# Patient Record
Sex: FEMALE | Race: WHITE | NOT HISPANIC OR LATINO | URBAN - METROPOLITAN AREA
[De-identification: names, ages, dates, MRNs, and addresses within clinical notes are randomized per-mention and may not be internally consistent; named-entity substitution may affect disease eponyms.]

---

## 2019-05-15 ENCOUNTER — EMERGENCY (EMERGENCY)
Facility: HOSPITAL | Age: 29
LOS: 1 days | Discharge: HOME | End: 2019-05-15
Attending: EMERGENCY MEDICINE | Admitting: EMERGENCY MEDICINE
Payer: COMMERCIAL

## 2019-05-15 VITALS
RESPIRATION RATE: 16 BRPM | WEIGHT: 110.01 LBS | OXYGEN SATURATION: 98 % | TEMPERATURE: 98 F | HEART RATE: 70 BPM | SYSTOLIC BLOOD PRESSURE: 96 MMHG | HEIGHT: 62 IN | DIASTOLIC BLOOD PRESSURE: 71 MMHG

## 2019-05-15 VITALS
SYSTOLIC BLOOD PRESSURE: 102 MMHG | DIASTOLIC BLOOD PRESSURE: 68 MMHG | HEART RATE: 69 BPM | TEMPERATURE: 98 F | OXYGEN SATURATION: 99 % | RESPIRATION RATE: 18 BRPM

## 2019-05-15 DIAGNOSIS — M79.89 OTHER SPECIFIED SOFT TISSUE DISORDERS: ICD-10-CM

## 2019-05-15 DIAGNOSIS — R60.0 LOCALIZED EDEMA: ICD-10-CM

## 2019-05-15 DIAGNOSIS — Z79.899 OTHER LONG TERM (CURRENT) DRUG THERAPY: ICD-10-CM

## 2019-05-15 PROCEDURE — 99284 EMERGENCY DEPT VISIT MOD MDM: CPT

## 2019-05-15 PROCEDURE — 93971 EXTREMITY STUDY: CPT | Mod: 26,LT

## 2019-05-15 PROCEDURE — 99284 EMERGENCY DEPT VISIT MOD MDM: CPT | Mod: 25

## 2019-05-15 PROCEDURE — 93971 EXTREMITY STUDY: CPT

## 2019-05-15 NOTE — ED PROVIDER NOTE - OBJECTIVE STATEMENT
here with swelling of left leg for past day.  Denies pain, trauma, fever/chills.  No chest pain, trouble breathing.  On ocp daily, no other meds.  No prior pe/dvt.  Saw her pmd and sent to r/o dvt.

## 2019-05-15 NOTE — ED ADULT NURSE NOTE - NSIMPLEMENTINTERV_GEN_ALL_ED
Implemented All Universal Safety Interventions:  [place] to call system. Call bell, personal items and telephone within reach. Instruct patient to call for assistance. Room bathroom lighting operational. Non-slip footwear when patient is off stretcher. Physically safe environment: no spills, clutter or unnecessary equipment. Stretcher in lowest position, wheels locked, appropriate side rails in place.

## 2019-05-15 NOTE — ED PROVIDER NOTE - NSFOLLOWUPINSTRUCTIONS_ED_ALL_ED_FT
Please see your primary care provider in 24-48 hours.  Rest, elevate your leg to help decrease swelling.  If swelling persists, repeat ultrasound in 1 week.  Return to the ER if symptoms worsen or other concerns.    Edema  Image   Edema is when you have too much fluid in your body or under your skin. Edema may make your legs, feet, and ankles swell up. Swelling is also common in looser tissues, like around your eyes. This is a common condition. It gets more common as you get older. There are many possible causes of edema. Eating too much salt (sodium) and being on your feet or sitting for a long time can cause edema in your legs, feet, and ankles. Hot weather may make edema worse.    Edema is usually painless. Your skin may look swollen or shiny.    Follow these instructions at home:  Keep the swollen body part raised (elevated) above the level of your heart when you are sitting or lying down.  Do not sit still or stand for a long time.  Do not wear tight clothes. Do not wear garters on your upper legs.  Exercise your legs. This can help the swelling go down.  Wear elastic bandages or support stockings as told by your doctor.  Eat a low-salt (low-sodium) diet to reduce fluid as told by your doctor.  Depending on the cause of your swelling, you may need to limit how much fluid you drink (fluid restriction).  Take over-the-counter and prescription medicines only as told by your doctor.  Contact a doctor if:  Treatment is not working.  You have heart, liver, or kidney disease and have symptoms of edema.  You have sudden and unexplained weight gain.  Get help right away if:  You have shortness of breath or chest pain.  You cannot breathe when you lie down.  You have pain, redness, or warmth in the swollen areas.  You have heart, liver, or kidney disease and get edema all of a sudden.  You have a fever and your symptoms get worse all of a sudden.  Summary  Edema is when you have too much fluid in your body or under your skin.  Edema may make your legs, feet, and ankles swell up. Swelling is also common in looser tissues, like around your eyes.  Raise (elevate) the swollen body part above the level of your heart when you are sitting or lying down.  Follow your doctor's instructions about diet and how much fluid you can drink (fluid restriction).  This information is not intended to replace advice given to you by your health care provider. Make sure you discuss any questions you have with your health care provider.

## 2019-05-15 NOTE — ED PROVIDER NOTE - CLINICAL SUMMARY MEDICAL DECISION MAKING FREE TEXT BOX
left leg mild swelling without redness/pain to suggest infection.  duplex done to r/o dvt and neg.  plan elevation, rest.  possible mild strain (was at gym yesterday).  return precautions discussed, repeat duplex in 1 wk if not improved
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
